# Patient Record
Sex: FEMALE | Race: BLACK OR AFRICAN AMERICAN | NOT HISPANIC OR LATINO | Employment: FULL TIME | URBAN - METROPOLITAN AREA
[De-identification: names, ages, dates, MRNs, and addresses within clinical notes are randomized per-mention and may not be internally consistent; named-entity substitution may affect disease eponyms.]

---

## 2020-01-27 ENCOUNTER — OFFICE VISIT (OUTPATIENT)
Dept: INTERNAL MEDICINE | Facility: CLINIC | Age: 40
End: 2020-01-27
Payer: MEDICAID

## 2020-01-27 VITALS
WEIGHT: 190.94 LBS | SYSTOLIC BLOOD PRESSURE: 138 MMHG | DIASTOLIC BLOOD PRESSURE: 86 MMHG | HEART RATE: 82 BPM | BODY MASS INDEX: 31.81 KG/M2 | OXYGEN SATURATION: 97 % | HEIGHT: 65 IN | TEMPERATURE: 98 F

## 2020-01-27 DIAGNOSIS — J45.909 ASTHMA, UNSPECIFIED ASTHMA SEVERITY, UNSPECIFIED WHETHER COMPLICATED, UNSPECIFIED WHETHER PERSISTENT: ICD-10-CM

## 2020-01-27 DIAGNOSIS — J40 BRONCHITIS: ICD-10-CM

## 2020-01-27 DIAGNOSIS — J45.909 ASTHMA, UNSPECIFIED ASTHMA SEVERITY, UNSPECIFIED WHETHER COMPLICATED, UNSPECIFIED WHETHER PERSISTENT: Primary | ICD-10-CM

## 2020-01-27 PROCEDURE — 99204 OFFICE O/P NEW MOD 45 MIN: CPT | Mod: S$PBB,,, | Performed by: FAMILY MEDICINE

## 2020-01-27 PROCEDURE — 99999 PR PBB SHADOW E&M-NEW PATIENT-LVL IV: ICD-10-PCS | Mod: PBBFAC,,, | Performed by: FAMILY MEDICINE

## 2020-01-27 PROCEDURE — 99204 OFFICE O/P NEW MOD 45 MIN: CPT | Mod: PBBFAC | Performed by: FAMILY MEDICINE

## 2020-01-27 PROCEDURE — 99999 PR PBB SHADOW E&M-NEW PATIENT-LVL IV: CPT | Mod: PBBFAC,,, | Performed by: FAMILY MEDICINE

## 2020-01-27 PROCEDURE — 99204 PR OFFICE/OUTPT VISIT, NEW, LEVL IV, 45-59 MIN: ICD-10-PCS | Mod: S$PBB,,, | Performed by: FAMILY MEDICINE

## 2020-01-27 RX ORDER — PREDNISONE 20 MG/1
40 TABLET ORAL
COMMUNITY
Start: 2020-01-27 | End: 2020-02-01

## 2020-01-27 RX ORDER — IPRATROPIUM BROMIDE AND ALBUTEROL SULFATE 2.5; .5 MG/3ML; MG/3ML
3 SOLUTION RESPIRATORY (INHALATION) EVERY 6 HOURS PRN
Qty: 1 BOX | Refills: 0 | Status: SHIPPED | OUTPATIENT
Start: 2020-01-27 | End: 2020-01-27 | Stop reason: SDUPTHER

## 2020-01-27 RX ORDER — ALBUTEROL SULFATE 90 UG/1
2 AEROSOL, METERED RESPIRATORY (INHALATION)
COMMUNITY
Start: 2020-01-27 | End: 2021-01-26

## 2020-01-27 RX ORDER — AZITHROMYCIN 250 MG/1
TABLET, FILM COATED ORAL
COMMUNITY
Start: 2020-01-27 | End: 2020-02-01

## 2020-01-27 RX ORDER — LORATADINE 10 MG/1
TABLET ORAL
COMMUNITY
Start: 2019-12-10

## 2020-01-27 RX ORDER — HYDROXYZINE HYDROCHLORIDE 25 MG/1
TABLET, FILM COATED ORAL
COMMUNITY
Start: 2019-11-18

## 2020-01-27 RX ORDER — HYDROCORTISONE 25 MG/G
CREAM TOPICAL
COMMUNITY
Start: 2019-12-10

## 2020-01-27 RX ORDER — TRIAMCINOLONE ACETONIDE 1 MG/G
OINTMENT TOPICAL
COMMUNITY
Start: 2019-12-10

## 2020-01-27 RX ORDER — IPRATROPIUM BROMIDE AND ALBUTEROL SULFATE 2.5; .5 MG/3ML; MG/3ML
3 SOLUTION RESPIRATORY (INHALATION) EVERY 6 HOURS PRN
Qty: 1 BOX | Refills: 0 | Status: SHIPPED | OUTPATIENT
Start: 2020-01-27 | End: 2021-01-26

## 2020-01-27 NOTE — PROGRESS NOTES
Subjective:      Patient ID: Kindra Blankenship is a 39 y.o. female.    Chief Complaint: Asthma; Follow-up (ER); and rx asthma machine      HPI:  Kindra Blankenship is a 39 year old female with asthma, insomnia, and migraines who presents to clinic today for emergency department follow up.    Patient is new to me.    Seen at [facility] ED yesterday due to SOB and cough for several days.  Endorsed history of bronchitis multiple times in the past.  Slipped and fell in bath tub several days ago and has also been having right sided hip pain since that time.  CXR and Hip X-ray obtained in ED; no fracture of hip and no infiltrate on CXR.  WBC elevated.  Patient given antitussive and nebs/steroids with improvement in symptoms.  Prescribed albuterol, prednisone, Z-pack, antitussive, and NSAIDs and recommended.  States she has not yet filled these medications as she was in the ED overnight.  States she needs a nebulizer machine.    States she was diagnosed with asthma around age [age].  Did do pulmonary function testing in the past.  Was given a nebulizer in the past.          Past Medical History:   Diagnosis Date    Hemorrhoids     Migraine        Past Surgical History:   Procedure Laterality Date    none         Family History   Problem Relation Age of Onset    Migraines Mother     Hypertension Mother     Hypertension Father     Hyperlipidemia Father     Stroke Father         5    Cancer Maternal Grandmother         breast/  stomach    Hypertension Maternal Grandmother     Cancer Paternal Grandmother         colon cancer    Hypertension Paternal Grandmother        Social History     Socioeconomic History    Marital status: Single     Spouse name: Not on file    Number of children: 1    Years of education: Not on file    Highest education level: Not on file   Occupational History    Occupation: [employer]   Social Needs    Financial resource strain: Not on file    Food insecurity:     Worry: Not on file      Inability: Not on file    Transportation needs:     Medical: Not on file     Non-medical: Not on file   Tobacco Use    Smoking status: Never Smoker    Smokeless tobacco: Never Used   Substance and Sexual Activity    Alcohol use: No    Drug use: No    Sexual activity: Yes     Partners: Male     Birth control/protection: None   Lifestyle    Physical activity:     Days per week: Not on file     Minutes per session: Not on file    Stress: Not on file   Relationships    Social connections:     Talks on phone: Not on file     Gets together: Not on file     Attends [employer] service: Not on file     Active member of club or organization: Not on file     Attends meetings of clubs or organizations: Not on file     Relationship status: Not on file   Other Topics Concern    Not on file   Social History Narrative    Not on file       Review of Systems   Constitutional: Negative for chills, fatigue and fever.   HENT: Negative for congestion, hearing loss, nosebleeds, rhinorrhea, sore throat and trouble swallowing.    Eyes: Negative for pain and visual disturbance.   Respiratory: Positive for cough, chest tightness and shortness of breath. Negative for wheezing.    Cardiovascular: Negative for chest pain and palpitations.   Gastrointestinal: Negative for abdominal distention, abdominal pain, constipation, diarrhea, nausea and vomiting.   Genitourinary: Negative for decreased urine volume, difficulty urinating, dysuria, hematuria and urgency.   Musculoskeletal: Negative for arthralgias, back pain and myalgias.   Skin: Negative for color change and rash.   Neurological: Negative for dizziness, tremors, weakness, light-headedness, numbness and headaches.   Psychiatric/Behavioral: Negative for agitation, behavioral problems and confusion. The patient is not nervous/anxious.      Objective:     Vitals:    01/27/20 1150   BP: 138/86   BP Location: Right arm   Patient Position: Sitting   BP Method: Medium (Manual)   Pulse: 82  "  Temp: 98.3 °F (36.8 °C)   TempSrc: Oral   SpO2: 97%   Weight: 86.6 kg (190 lb 14.7 oz)   Height: 5' 5" (1.651 m)       Physical Exam   Constitutional: She appears well-developed and well-nourished.   HENT:   Head: Normocephalic and atraumatic.   Right Ear: External ear normal.   Left Ear: External ear normal.   Nose: Nose normal.   Mouth/Throat: Oropharynx is clear and moist.   Eyes: Pupils are equal, round, and reactive to light. Conjunctivae are normal.   Neck: Neck supple. No tracheal deviation present.   Cardiovascular: Normal rate, regular rhythm and normal heart sounds. Exam reveals no gallop and no friction rub.   No murmur heard.  Pulmonary/Chest: Effort normal. No respiratory distress. She has decreased breath sounds in the right middle field, the right lower field, the left middle field and the left lower field. She has no wheezes. She has no rales.   Abdominal: Soft. Bowel sounds are normal. She exhibits no distension. There is no tenderness. There is no rebound and no guarding.   Musculoskeletal: She exhibits no deformity.   Neurological: She is alert.   Skin: Skin is warm and dry.   Psychiatric: She has a normal mood and affect. Her behavior is normal.   Nursing note and vitals reviewed.     Assessment:      1. Asthma, unspecified asthma severity, unspecified whether complicated, unspecified whether persistent    2. Bronchitis      Plan:   Kindra was seen today for asthma, follow-up and rx asthma machine.    Diagnoses and all orders for this visit:    Asthma, unspecified asthma severity, unspecified whether complicated, unspecified whether persistent  -     NEBULIZER FOR HOME USE  -     albuterol-ipratropium (DUO-NEB) 2.5 mg-0.5 mg/3 mL nebulizer solution; Take 3 mLs by nebulization every 6 (six) hours as needed for Wheezing. Rescue  -     Ambulatory Referral to Pulmonology ([facility] pulmonology)  -     Recommended patient start the Rx's as prescribed on hospital discharge    Bronchitis  -     NEBULIZER " FOR HOME USE  -     albuterol-ipratropium (DUO-NEB) 2.5 mg-0.5 mg/3 mL nebulizer solution; Take 3 mLs by nebulization every 6 (six) hours as needed for Wheezing. Rescue  -     Ambulatory Referral to Pulmonology ([facility] pulmonology)      To follow up with resident clinic to establish PCP.

## 2020-07-20 ENCOUNTER — CLINICAL SUPPORT (OUTPATIENT)
Dept: REHABILITATION | Facility: HOSPITAL | Age: 40
End: 2020-07-20
Payer: MEDICAID

## 2020-07-20 DIAGNOSIS — R29.898 WEAKNESS OF BOTH HIPS: ICD-10-CM

## 2020-07-20 PROCEDURE — 97161 PT EVAL LOW COMPLEX 20 MIN: CPT | Mod: PO

## 2020-07-20 NOTE — PLAN OF CARE
"[facility]  Physical Therapy Initial Evaluation    Name: Kindra Blankenship  Clinic Number: [id number]    Therapy Diagnosis:   Encounter Diagnosis   Name Primary?    Weakness of both hips      Physician: Ernestina England NP-C    Physician Orders: PT Eval and Treat   Medical Diagnosis from Referral: M16.10 (ICD-10-CM) - Primary localized osteoarthrosis of the hip  Evaluation Date: 7/20/2020  Authorization Period Expiration: 8/20/2020  Plan of Care Expiration: 10/20/2020  Visit # / Visits authorized: 1/ 1    Time In: 118pm  Time Out: 200pm  Total Billable Time: 42 minutes (Eval only)    Precautions: Standard    Subjective   Date of onset: 1 year ago  History of current condition - Kindra reports: having a fall 1 year ago that resulted in a hairline fracture in the R hip. She fell again 6 months ago and the pain got worse. She was diagnoses with RA of B hips in June. She has not began treatment for her RA yet but has an appointment with her MD in 2 weeks. Her pain is in the buttocks and groin, R is more painful than L. Activity and heat makes her symptoms better. Rest makes her symptoms worse. Night and morning both are painful. Pt needs assistance from her son for certain ADLs.       Past Medical History:   Diagnosis Date    Hemorrhoids     Migraine      Kindra Blankenship  has a past surgical history that includes none.    Kindra has a current medication list which includes the following prescription(s): acetaminophen-codeine 300-30mg, albuterol, albuterol-ipratropium, butalbital-acetaminophen-caffeine -40 mg, dextromethorphan-guaifenesin, fluticasone propionate, hydrocortisone, hydroxyzine hcl, loratadine, metronidazole, montelukast, pramipexole, topiramate, and triamcinolone acetonide 0.1%.    Review of patient's allergies indicates:   Allergen Reactions    Sulfa (sulfonamide antibiotics) Anaphylaxis and Rash     "Burning"        Imaging, none:     Prior Therapy: Yes, low " back  Social History: 1 story, 2 steps to get inside,  lives with their son  Occupation: Unemployed  Prior Level of Function: Ind  Current Level of Function: Needs some assistance with ADLs    Pain:  Current 6/10, worst 10/10, best 2/10   Location: bilateral buttocks  and groin   Description: Shooting  Aggravating Factors: Night Time and Morning  Easing Factors: hot bath and Activity    Pts goals: Learn how to minimize the pain on her own.     Objective     Observation: Pt ambulates into clinic independently. She has ER of the hips B and decreased step length B.     Posture: no posture abnormalities noted     Hip Range of Motion:   Right AROM/PROM Left AROM/PROM   Flexion 90/100 90/95   Abduction 45 45   Extension 0 0   Ext. Rotation 20 20   Int. Rotation 10 10   Pain reported in all directions, especially IR.    Lower Extremity Strength  Right LE  Left LE    Knee extension: 5/5 Knee extension: 5/5   Knee flexion: 4/5 Knee flexion: 4/5   Hip flexion: 3+/5 Hip flexion: 3+/5   Hip Internal Rotation:  3+/5    Hip Internal Rotation: 3+/5      Hip External Rotation: 3+/5    Hip External Rotation: 3+/5      Hip extension:  3+/5 Hip extension: 3+/5   Hip abduction: 3+/5 Hip abduction: 3+/5   Hip adduction: 4+/5 Hip adduction: 4+/5   Ankle dorsiflexion: 5/5 Ankle dorsiflexion: 5/5   Ankle plantarflexion: 5/5 Ankle plantarflexion: 5/5     Pain with:  - Knee flexion and all motions at the hip    Special Tests:  ANDREW: Positive bilaterally  FADIR: Positive bilaterally   SLR: neural tension on R, negative on L    Palpation: no TTP noted    Sensation: intact    Edema: none        CMS Impairment/Limitation/Restriction for FOTO Hip Survey    Therapist reviewed FOTO scores for Kindra Blankenship on 7/20/2020.   FOTO documents entered into [employer] - see Media section.    Based on clinical presentation  Limitation Score: 40%           TREATMENT     Home Exercises and Patient Education Provided    Education provided re: HEP, anatomy of  hip, safety    Written Home Exercises Provided: No (will be provided at next treatment).  Exercises were reviewed and Knidra was able to demonstrate them prior to the end of the session.   Pt received a written copy of exercises to perform at home. Kindra demonstrated good  understanding of the education provided.     Assessment   Kindra is a 39 y.o. female referred to outpatient Physical Therapy with a medical diagnosis of primary localized osteoarthrosis of the hip. Pt reports a recent diagnosis of RA in both hips. She has yet to begin treatment for the RA but will be starting her medication on Wednesday and has a f/u visit with her MD next week. She presents with B hip pain, decreased hip ROM bilaterally, and weakness of both hips. Her R side is more bothersome than her L at this time. Pt was educated on performing bridges and SL clams as an HEP to begin hip strengthening. She will benefit from skilled PT services to focus on LE strengthening, hip ROM, and muscle flexibility to reduce her pain and improve her functional mobility.     Pt prognosis is Good.   Pt will benefit from skilled outpatient Physical Therapy to address the deficits stated above and in the chart below, provide pt/family education, and to maximize pt's level of independence.     Plan of care discussed with patient: Yes  Pt's spiritual, cultural and educational needs considered and patient is agreeable to the plan of care and goals as stated below:     Anticipated Barriers for therapy: None    Medical Necessity is demonstrated by the following  History  Co-morbidities and personal factors that may impact the plan of care Co-morbidities:   None    Personal Factors:   no deficits     low   Examination  Body Structures and Functions, activity limitations and participation restrictions that may impact the plan of care Body Regions:   lower extremities    Body Systems:    ROM  strength  motor control    Participation Restrictions:   None    Activity  limitations:   Learning and applying knowledge  no deficits    General Tasks and Commands  no deficits    Communication  no deficits    Mobility  no deficits    Self care  no deficits    Domestic Life  no deficits    Interactions/Relationships  no deficits    Life Areas  no deficits    Community and Social Life  no deficits         low   Clinical Presentation stable and uncomplicated low   Decision Making/ Complexity Score: low     Goals:  Short Term Goals (4 Weeks):   1. Pt will be compliant with HEP to supplement PT in restoring pain free function.  2. Pt will report B hip pain no greater than a 5/10 at all times to improve her QOL and functional independence.   3. Pt will improve impaired LE MMTs by 1/2 grade  to improve strength for functional tasks  4. Pt improve impaired hip ROM by >/=10 deg in all planes to improve mobility for normal movement patterns.   Long Term Goals (8 Weeks):  1. Pt will demonstrate no noticeable gait deviations during ambulation to promote normal functional mobility  2. Pt will report B hip pain no greater than a 3/10 at all times to improver her QOL and functional independence.   3. Pt will improve impaired LE MMTs by 1 grade to improve strength for functional tasks.  4. Pt improve impaired hip ROM by >/= 15 deg in all planes to improve mobility for normal movement patterns.       Plan   Plan of care Certification: 7/20/2020 to 10/20/2020.    Outpatient Physical Therapy 2 times weekly for 8 weeks to include the following interventions: Manual Therapy, Moist Heat/ Ice, Neuromuscular Re-ed, Patient Education, Therapeutic Activites and Therapeutic Exercise.     JANETT ARGUELLO, PT

## 2020-07-30 NOTE — PROGRESS NOTES
Physical Therapy Treatment Note     Name: Kindra LESLIE [facility] Number: [id number]    Therapy Diagnosis: No diagnosis found.  Physician: Ernestina England NP-C    Visit Date: 7/31/2020    Physician Orders: PT Eval and Treat   Medical Diagnosis from Referral: M16.10 (ICD-10-CM) - Primary localized osteoarthrosis of the hip  Evaluation Date: 7/20/2020  Authorization Period Expiration: 8/20/2020  Plan of Care Expiration: 10/20/2020  Visit # / Visits authorized: 1/ 1 (plus 1 for the IE)    Time In: 1535  Time Out: 1615  Total Billable Time: 40 minutes    Precautions: Standard    Subjective     Pt reports: had severe pain 2 days ago but it decreased some.  She was compliant with home exercise program.  Response to previous treatment: no adverse response  Functional change: none    Pain: 7/10  Location: bilateral hips      Objective     Kindra received therapeutic exercises to develop strength, endurance, ROM, flexibility, posture and core stabilization for 32 minutes including:    LTR 10x with 5 sec holds B  DKTC with physio ball 15x with 5 sec holds  Bridges with hip add with ball 2x10 with 5 sec holds  Bridges with hip abd with green band 2x1  S/L clamshells with green band 2x10 with 5 sec B  Prone HS curls with 4 lbs ankle weights 2x10  Prone hip IR/ER with yellow band 2x10 B  Hip flexor stretch 2 min B    Hip add stretch np  Hip flexor strengthening np      Kindra received the following manual therapy techniques for 8 minutes, including:    · Joint mobilizations - short axis hip distraction with a strap        Home Exercises Provided and Patient Education Provided     Education provided: Pt was educated on HEP and on all therapeutic exercises performed during today's tx visit.     Written Home Exercises Provided: no as pt received HEP during the initial evaluation.   Exercises were reviewed and Kindra was able to demonstrate them prior to the end of the session.  Kindra demonstrated good  understanding of the  education provided.     See EMR under Patient Instructions for exercises provided 7/31/2020 and 7/31/20.    Assessment   Pt tolerated tx fairly as pt felt mild B hip pain with bridges, clamshells and IR/ER hip rotations. Tx focused on hip strengthening/flexibility and BLE strengthening. Pt was instructed to perform HEP on a regular basis in order to obtain max results. Will progress as tolerated.     Kindra is progressing well towards her goals.   Pt prognosis is Good.     Pt will continue to benefit from skilled outpatient physical therapy to address the deficits listed in the problem list box on initial evaluation, provide pt/family education and to maximize pt's level of independence in the home and community environment.     Pt's spiritual, cultural and educational needs considered and pt agreeable to plan of care and goals.     Anticipated barriers to physical therapy: none    Goals:     Short Term Goals (4 Weeks):   1. Pt will be compliant with HEP to supplement PT in restoring pain free function.  2. Pt will report B hip pain no greater than a 5/10 at all times to improve her QOL and functional independence.   3. Pt will improve impaired LE MMTs by 1/2 grade  to improve strength for functional tasks  4. Pt improve impaired hip ROM by >/=10 deg in all planes to improve mobility for normal movement patterns.   Long Term Goals (8 Weeks):  1. Pt will demonstrate no noticeable gait deviations during ambulation to promote normal functional mobility  2. Pt will report B hip pain no greater than a 3/10 at all times to improver her QOL and functional independence.   3. Pt will improve impaired LE MMTs by 1 grade to improve strength for functional tasks.  4. Pt improve impaired hip ROM by >/= 15 deg in all planes to improve mobility for normal movement patterns.     Plan   Continue POC established by PT, including: B hip ROM/stabilization and BLE strengthening.       Gerson Castillo, PTA

## 2020-07-31 ENCOUNTER — CLINICAL SUPPORT (OUTPATIENT)
Dept: REHABILITATION | Facility: HOSPITAL | Age: 40
End: 2020-07-31
Payer: MEDICAID

## 2020-07-31 DIAGNOSIS — R29.898 WEAKNESS OF BOTH HIPS: ICD-10-CM

## 2020-07-31 PROCEDURE — 97140 MANUAL THERAPY 1/> REGIONS: CPT | Mod: PO,CQ

## 2020-07-31 PROCEDURE — 97110 THERAPEUTIC EXERCISES: CPT | Mod: PO,CQ

## 2021-01-20 ENCOUNTER — OFFICE VISIT (OUTPATIENT)
Dept: OBSTETRICS AND GYNECOLOGY | Facility: CLINIC | Age: 41
End: 2021-01-20
Payer: MEDICAID

## 2021-01-20 VITALS
BODY MASS INDEX: 29.68 KG/M2 | DIASTOLIC BLOOD PRESSURE: 82 MMHG | SYSTOLIC BLOOD PRESSURE: 118 MMHG | WEIGHT: 178.13 LBS | HEIGHT: 65 IN

## 2021-01-20 DIAGNOSIS — Z01.419 WELL WOMAN EXAM WITH ROUTINE GYNECOLOGICAL EXAM: Primary | ICD-10-CM

## 2021-01-20 PROCEDURE — 99999 PR PBB SHADOW E&M-EST. PATIENT-LVL III: CPT | Mod: PBBFAC,,, | Performed by: STUDENT IN AN ORGANIZED HEALTH CARE EDUCATION/TRAINING PROGRAM

## 2021-01-20 PROCEDURE — 99999 PR PBB SHADOW E&M-EST. PATIENT-LVL III: ICD-10-PCS | Mod: PBBFAC,,, | Performed by: STUDENT IN AN ORGANIZED HEALTH CARE EDUCATION/TRAINING PROGRAM

## 2021-01-20 PROCEDURE — 99386 PREV VISIT NEW AGE 40-64: CPT | Mod: S$PBB,,, | Performed by: STUDENT IN AN ORGANIZED HEALTH CARE EDUCATION/TRAINING PROGRAM

## 2021-01-20 PROCEDURE — 88142 CYTOPATH C/V THIN LAYER: CPT

## 2021-01-20 PROCEDURE — 99386 PR PREVENTIVE VISIT,NEW,40-64: ICD-10-PCS | Mod: S$PBB,,, | Performed by: STUDENT IN AN ORGANIZED HEALTH CARE EDUCATION/TRAINING PROGRAM

## 2021-01-20 PROCEDURE — 99213 OFFICE O/P EST LOW 20 MIN: CPT | Mod: PBBFAC | Performed by: STUDENT IN AN ORGANIZED HEALTH CARE EDUCATION/TRAINING PROGRAM

## 2021-01-20 PROCEDURE — 87624 HPV HI-RISK TYP POOLED RSLT: CPT

## 2021-01-20 RX ORDER — BUDESONIDE AND FORMOTEROL FUMARATE DIHYDRATE 160; 4.5 UG/1; UG/1
AEROSOL RESPIRATORY (INHALATION)
COMMUNITY
Start: 2020-11-09

## 2021-01-20 RX ORDER — PREDNISONE 20 MG/1
TABLET ORAL
COMMUNITY
Start: 2020-11-12

## 2021-01-20 RX ORDER — ALBUTEROL SULFATE 0.83 MG/ML
SOLUTION RESPIRATORY (INHALATION)
COMMUNITY
Start: 2020-11-10

## 2021-01-28 LAB
HPV HR 12 DNA SPEC QL NAA+PROBE: NEGATIVE
HPV16 AG SPEC QL: NEGATIVE
HPV18 DNA SPEC QL NAA+PROBE: NEGATIVE

## 2021-02-12 ENCOUNTER — PATIENT MESSAGE (OUTPATIENT)
Dept: ADMINISTRATIVE | Facility: HOSPITAL | Age: 41
End: 2021-02-12

## 2021-02-13 LAB
FINAL PATHOLOGIC DIAGNOSIS: NORMAL
Lab: NORMAL

## 2021-04-16 ENCOUNTER — PATIENT MESSAGE (OUTPATIENT)
Dept: RESEARCH | Facility: HOSPITAL | Age: 41
End: 2021-04-16

## 2022-06-16 ENCOUNTER — CLINICAL SUPPORT (OUTPATIENT)
Dept: REHABILITATION | Facility: HOSPITAL | Age: 42
End: 2022-06-16
Payer: MEDICAID

## 2022-06-16 DIAGNOSIS — R29.898 WEAKNESS OF BOTH HIPS: Primary | ICD-10-CM

## 2022-06-16 DIAGNOSIS — G89.29 CHRONIC LEFT-SIDED LOW BACK PAIN WITH LEFT-SIDED SCIATICA: ICD-10-CM

## 2022-06-16 DIAGNOSIS — M54.42 CHRONIC LEFT-SIDED LOW BACK PAIN WITH LEFT-SIDED SCIATICA: ICD-10-CM

## 2022-06-16 DIAGNOSIS — R29.898 LEG WEAKNESS, BILATERAL: ICD-10-CM

## 2022-06-16 PROCEDURE — 97110 THERAPEUTIC EXERCISES: CPT

## 2022-06-16 PROCEDURE — 97161 PT EVAL LOW COMPLEX 20 MIN: CPT

## 2022-06-16 NOTE — PROGRESS NOTES
[facility]   Physical Therapy Initial Evaluation     Date: 6/16/2022   Name: Kindra LESLIE [facility] Number: [id number]    Therapy Diagnosis:   Encounter Diagnoses   Name Primary?    Weakness of both hips Yes    Leg weakness, bilateral     Chronic left-sided low back pain with left-sided sciatica      Physician: Barbara Chong FNP    Physician Orders: PT Eval and Treat   Medical Diagnosis from Referral: M54.9 (ICD-10-CM) - Pain in back  Evaluation Date: 6/16/2022  Authorization Period Expiration: 7/10/2022  Plan of Care Expiration: 7/20/2022  Progress Note Due: 7/4/2022  Visit # / Visits authorized: 1/ 1   FOTO: 1/3    Precautions: Standard     Time In: 2:00pm   Time Out: 3:00pm   Total Appointment Time (timed & untimed codes): 60 minutes      SUBJECTIVE     Date of onset: Back pain 7 months, L hip 3 years     History of current condition - Kindra reports:  She would like a L hip replacement but she was told she was told she is too young. She tried PT before but it hurt. She had an injection recently and the pain was gone for about 10 days but it is back. She had numbness in the groin area, the MD Is aware. She reports no unexpected weight loss or weight gain and no loss of bowel or bladder function     Falls: 3 years ago, she had a hairline fx of the hip    Imaging, She had x-rays for the hip recently but it is not visible in her chart     Prior Therapy: yes   Social History: Lives alone   Occupation: Not working   Prior Level of Function: Independent   Current Level of Function: Some days she can't get out of the bed, it hurts so bad. Limited in sitting, limited in driving, hard to get up from a bath    Pain:    Current 7/10, worst 10/10, best 0/10   Location: L low back, shoots down in the posterior leg   Description: sharp   Aggravating Factors: sitting for too long, walking a  Far distance   Easing Factors: heat     Patients goals: learn how to manage the pain, ease the  "pain      Medical History:   Past Medical History:   Diagnosis Date    Hemorrhoids     Migraine          Surgical History:   Kindra Blankenship  has a past surgical history that includes none.    Medications:   Kindra has a current medication list which includes the following prescription(s): acetaminophen-codeine 300-30mg, albuterol, albuterol-ipratropium, butalbital-acetaminophen-caffeine -40 mg, dextromethorphan-guaifenesin, fluticasone propionate, hydrocortisone, hydroxyzine hcl, loratadine, montelukast, pramipexole, prednisone, symbicort, topiramate, and triamcinolone acetonide 0.1%.    Allergies:   Review of patient's allergies indicates:   Allergen Reactions    Sulfa (sulfonamide antibiotics) Anaphylaxis and Rash     "Burning"          OBJECTIVE     Observation: Pt arrives with no AD     Posture:  Lumbar lordosis     Gait: ER with gait, antalgic on L     Lumbar Range of Motion:    Degrees Pain   Flexion 75%   Worse pain         Extension 50%   Less pain         Left Side Bending 25% Worse pain         Right Side Bending 25% No pain         Left rotation   25%  No pain         Right Rotation   25% No pain                Lower Extremity Strength  Right LE  Left LE    Knee extension: 4/5 Knee extension: 4/5   Knee flexion: 4+/5 Knee flexion: 4+/5   Hip flexion: 4/5 Hip flexion: 4/5   Hip extension:  4/5 Hip extension: 4/5   Hip abduction: 4/5 Hip abduction: 4/5         Special Tests:  -Repeated Flexion: no change   -Repeated Ext: no change   30 second sit to stand: 9 stands, increased pain   Squats: poor form, 5 squats increased symptoms       DTR:   Right Left Comment   Patellar (L3-4) 2+ 2+    Achilles (S1) 2+ 2+        Neuro Dynamic Testing:    Sciatic nerve:      SLR: R = -     L = -      Palpation: Tender along lumbar paraspinals           Limitation/Restriction for FOTO Lumbar Spine Survey    Therapist reviewed FOTO scores for Kindra Blankenship on 6/16/2022.   FOTO documents entered into EP[employer] - see " Media section.    Limitation Score: 40%         TREATMENT     Total Treatment time (time-based codes) separate from Evaluation: 25 minutes      Kindra received the treatments listed below:      therapeutic exercises to develop strength, endurance, ROM, flexibility, posture and core stabilization for 25 minutes including:  diaphragmatic breathing   LTRs     PATIENT EDUCATION AND HOME EXERCISES     Education provided:   - HEP, plan of care, stop HEP if painful     Written Home Exercises Provided: yes. Exercises were reviewed and Kindra was able to demonstrate them prior to the end of the session.  Kindra demonstrated good  understanding of the education provided. See EMR under Patient Instructions for exercises provided during therapy sessions.    ASSESSMENT     Kindra is a 41 y.o. female referred to outpatient Physical Therapy with a medical diagnosis of M54.9 (ICD-10-CM) - Pain in back. Patient presents with decreased low back ROM, LE weakness, gait deviations, and poor postural control with squatting. Pt's SLR was negative but she does report pain going into her legs. Pt reports having a hx of groin numbness that her MD is aware of. Due to her reports of therapy hurting before, pt was educated that therapy will start out gradually to increase tolerance. Pt would benefit from therapy to decrease pain, improve strength, and increase independence.     Patient prognosis is Good.   Patient will benefit from skilled outpatient Physical Therapy to address the deficits stated above and in the chart below, provide patient /family education, and to maximize patientt's level of independence.     Plan of care discussed with patient: Yes  Patient's spiritual, cultural and educational needs considered and patient is agreeable to the plan of care and goals as stated below:     Anticipated Barriers for therapy: none     Medical Necessity is demonstrated by the following  History  Co-morbidities and personal factors that may impact  the plan of care Co-morbidities:   none    Personal Factors:   no deficits     low   Examination  Body Structures and Functions, activity limitations and participation restrictions that may impact the plan of care Body Regions:   back  lower extremities    Body Systems:    gross symmetry  ROM  strength  gross coordinated movement  transfers    Participation Restrictions:   none    Activity limitations:   Learning and applying knowledge  no deficits    General Tasks and Commands  no deficits    Communication  no deficits    Mobility  lifting and carrying objects  walking  driving (bike, car, motorcycle)    Self care  washing oneself (bathing, drying, washing hands)    Domestic Life  no deficits    Interactions/Relationships  no deficits    Life Areas  no deficits    Community and Social Life  no deficits         moderate   Clinical Presentation evolving clinical presentation with changing clinical characteristics moderate   Decision Making/ Complexity Score: low     Goals:  Short Term Goals: 2 weeks   1. In 2 weeks, pt will be able to perform a PPT with no cueing to demonstrate an improvement in postural control   2. In 2 weeks, pt will report 6/10 LBP at rest to signify a decrease in pain   3. In 2 weeks, pt will be independent with her HEP    Long Term Goals: 4 weeks   1. In 4 weeks, pt will be able to perform 5 squats with proper form and no increase in LBP to signify an improvement in transfers   2. In 4 weeks, pt will have an improved 30 second sit to stand score of 11 stands to signify improved mobility   3. In 4 weeks, pt will report being able to use techniques used in clinic to decrease pain     PLAN   Plan of care Certification: 6/16/2022 to 7/20/2022.    Outpatient Physical Therapy 2 times weekly for 4 weeks to include the following interventions: Gait Training, Manual Therapy, Neuromuscular Re-ed, Patient Education, Self Care, Therapeutic Activities and Therapeutic Exercise.     Soumya Walker, PT      I  CERTIFY THE NEED FOR THESE SERVICES FURNISHED UNDER THIS PLAN OF TREATMENT AND WHILE UNDER MY CARE   Physician's comments:     Physician's Signature: ___________________________________________________

## 2022-06-20 PROBLEM — M54.40 CHRONIC LEFT-SIDED LOW BACK PAIN WITH SCIATICA: Status: ACTIVE | Noted: 2022-06-20

## 2022-06-20 PROBLEM — R29.898 LEG WEAKNESS, BILATERAL: Status: ACTIVE | Noted: 2022-06-20

## 2022-06-20 PROBLEM — G89.29 CHRONIC LEFT-SIDED LOW BACK PAIN WITH SCIATICA: Status: ACTIVE | Noted: 2022-06-20
